# Patient Record
(demographics unavailable — no encounter records)

---

## 2025-01-17 NOTE — PHYSICAL EXAM
[Excoriation] : no perianal excoriation [Fistula] : no fistulas [Wart] : no warts [Ulcer ___ cm] : no ulcers [Nonprolapsing] : a nonprolapsing (grade I) [Tender, Swollen] : nontender, non-swollen [Thrombosed] : that was not thrombosed [Skin Tags] : there were no residual hemorrhoidal skin tags seen [Normal] : was normal [None] : there was no rectal mass  [Stool Sample Taken] : no stool obtained on rectal exam [Gross Blood] : no gross blood [de-identified] : ext: no lesions seen, some soiling noted.  Digita: WNL, no masses, WNL tone. anoscopy: no lesions seen, grade 0 hemorrhoids, no fissure or proctitis

## 2025-01-17 NOTE — PHYSICAL EXAM
[Excoriation] : no perianal excoriation [Fistula] : no fistulas [Wart] : no warts [Ulcer ___ cm] : no ulcers [Nonprolapsing] : a nonprolapsing (grade I) [Tender, Swollen] : nontender, non-swollen [Thrombosed] : that was not thrombosed [Skin Tags] : there were no residual hemorrhoidal skin tags seen [Normal] : was normal [None] : there was no rectal mass  [Stool Sample Taken] : no stool obtained on rectal exam [Gross Blood] : no gross blood [de-identified] : ext: no lesions seen, some soiling noted.  Digita: WNL, no masses, WNL tone. anoscopy: no lesions seen, grade 0 hemorrhoids, no fissure or proctitis

## 2025-01-17 NOTE — ASSESSMENT
[FreeTextEntry1] : Negative examination. Anoscopy performed and revealed no lesions. Asymptomatic. Recommended repeat exam in 6-12 months.  to call for problems

## 2025-01-17 NOTE — HISTORY OF PRESENT ILLNESS
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 Last follow up in 12/2022 fond to have no lesions. Currently, he feels great, has not noticed anything, denies bloody bowel movement, and has a BM daily.

## 2025-03-28 NOTE — HISTORY OF PRESENT ILLNESS
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 Denies any drainage or bleeding. Tenderness to touch only. Denies fever or chills. Bowel movements are daily x1-2. Stools are soft. Denies pain with BM. Denies need to strain. Denies blood in stool. Denies anal itching.

## 2025-03-28 NOTE — ASSESSMENT
[FreeTextEntry1] : 1cm x0.5 cm lump with a tiny white spot, about to open. No induration or erythema noted.  Denies pain, fever or chills. He does not think it has gotten bigger since yesterday.  Informed patient that we can help him schedule with another CRS for I and D today or he can go urgent care or ER for I and D. Patient prefers to come back on Monday to be seen by Dr. العلي.  Also informed that it could drain spontaneously, and he should still come to clinic on Monday.  Recommended to go to ER if it gets bigger, increase in pain, fever or chills. Verbalized understanding.  Recommended to do Sitz baths 2-3 times a day and after bowel movements.  Scheduled RTC for 3/28/25.

## 2025-03-28 NOTE — PHYSICAL EXAM
[No Rash or Lesion] : No rash or lesion [Purpura] : no purpura  [Petechiae] : no petechiae [Skin Ulcer] : no ulcer [Skin Induration] : no induration [Alert] : alert [Oriented to Person] : oriented to person [Oriented to Place] : oriented to place [Oriented to Time] : oriented to time [Calm] : calm [de-identified] : NAD [de-identified] : Full ROM in all extremities [de-identified] : 1cm x0.5 cm lump with a white spot like a arora about to open. No induration or erythema noted.

## 2025-03-28 NOTE — REVIEW OF SYSTEMS
[Fever] : no fever [Chills] : no chills [Recent Weight Gain (___ Lbs)] : no recent weight gain [Sore Throat] : no sore throat [Hoarseness] : no hoarseness [Chest Pain] : no chest pain [Palpitations] : no palpitations [Shortness Of Breath] : no shortness of breath [Wheezing] : no wheezing [Cough] : no cough [SOB on Exertion] : no shortness of breath during exertion [Abdominal Pain] : no abdominal pain [Vomiting] : no vomiting [Constipation] : no constipation [Diarrhea] : no diarrhea [Dysuria] : no dysuria [Incontinence] : no incontinence [Hesitancy] : no urinary hesitancy [Nocturia] : no nocturia [Itching] : no itching [Confused] : no confusion [Convulsions] : no convulsions [Dizziness] : no dizziness [Fainting] : no fainting [Anxiety] : no anxiety [Depression] : no depression [Easy Bleeding] : no tendency for easy bleeding [Easy Bruising] : no tendency for easy bruising [Negative] : Heme/Lymph

## 2025-03-31 NOTE — PHYSICAL EXAM
[Ulcer ___ cm] : a [unfilled] ~Ucm ulcer [Nonprolapsing] : a nonprolapsing (grade I) [Normal] : was normal [None] : there was no rectal mass  [Normal Breath Sounds] : Normal breath sounds [Normal Heart Sounds] : normal heart sounds [No Rash or Lesion] : No rash or lesion [Alert] : alert [Oriented to Person] : oriented to person [Oriented to Place] : oriented to place [Oriented to Time] : oriented to time [Calm] : calm [Abdomen Masses] : No abdominal masses [Abdomen Tenderness] : ~T No ~M abdominal tenderness [Excoriation] : no perianal excoriation [Fistula] : no fistulas [Wart] : no warts [Tender, Swollen] : nontender, non-swollen [Thrombosed] : that was not thrombosed [Skin Tags] : there were no residual hemorrhoidal skin tags seen [Stool Sample Taken] : no stool obtained on rectal exam [Gross Blood] : no gross blood [de-identified] : minnie [de-identified] : right posterior lateral quadrant has < 1 cm area of superficially inflamed skin but no abscess.  minimal minor induration underneath the inflamed skin.  No pus or fluid extrudable.  digital: WNL, no masses.  Bidigital: No tract palpable.  anoscopy: 4 passes: no IO seen or pus or drainage in anal canal.  Looks fine.   [de-identified] : NAD

## 2025-03-31 NOTE — ASSESSMENT
[FreeTextEntry1] : Small ? furuncle or small sebascsous cyst that spontaneously drained over the weekend, No abscess or signfiicantly indurated area noted.  No flucuance.  < 1 cm of mildly inflamed skin remains. Having good bowel function. Symptoms of 3 days ago have largely resolved.  Not had a colonoscopy yet but has no risk factors and has had no bleeding. If new sx's develop he will call.   All questions answered Sitz baths recommended

## 2025-03-31 NOTE — HISTORY OF PRESENT ILLNESS
[FreeTextEntry1] : 34 y/o male with Hx of anal condyloma, last excision 2/6/2020, here for perianal lump that appeared last Thursday. Denies any bleeding. Tenderness to touch only. Denies fever or chills. Since Friday night, he felt better. The lump has gotten smaller. Saw a bead of pus on Friday when he wiped. Denies pain now.  Bowel movements are daily x1-2. Stools are soft. Denies pain with BM. Denies need to strain. Denies blood in stool. Denies anal itching. Doing sitz baths BID.